# Patient Record
Sex: MALE | Race: WHITE | NOT HISPANIC OR LATINO | Employment: UNEMPLOYED | URBAN - METROPOLITAN AREA
[De-identification: names, ages, dates, MRNs, and addresses within clinical notes are randomized per-mention and may not be internally consistent; named-entity substitution may affect disease eponyms.]

---

## 2017-12-06 ENCOUNTER — NON-PROVIDER VISIT (OUTPATIENT)
Dept: URGENT CARE | Facility: PHYSICIAN GROUP | Age: 42
End: 2017-12-06

## 2017-12-06 DIAGNOSIS — Z02.1 PRE-EMPLOYMENT DRUG SCREENING: ICD-10-CM

## 2017-12-06 LAB
AMP AMPHETAMINE: NORMAL
COC COCAINE: NORMAL
INT CON NEG: NEGATIVE
INT CON POS: POSITIVE
MET METHAMPHETAMINES: NORMAL
OPI OPIATES: NORMAL
PCP PHENCYCLIDINE: NORMAL
POC DRUG COMMENT 753798-OCCUPATIONAL HEALTH: NORMAL
THC: NORMAL

## 2017-12-06 PROCEDURE — 80305 DRUG TEST PRSMV DIR OPT OBS: CPT | Performed by: FAMILY MEDICINE

## 2017-12-12 ENCOUNTER — NON-PROVIDER VISIT (OUTPATIENT)
Dept: OCCUPATIONAL MEDICINE | Facility: CLINIC | Age: 42
End: 2017-12-12

## 2017-12-12 DIAGNOSIS — Z02.83 ENCOUNTER FOR DRUG SCREENING: ICD-10-CM

## 2017-12-12 PROCEDURE — 8911 PR MRO FEE: Performed by: INTERNAL MEDICINE

## 2017-12-13 NOTE — PROGRESS NOTES
Non-conclusive UDS follow up.  MRO confirmation fees need to be charged.  MRO report date 12/12/17

## 2019-02-27 ENCOUNTER — HOSPITAL ENCOUNTER (INPATIENT)
Facility: MEDICAL CENTER | Age: 44
LOS: 4 days | DRG: 082 | End: 2019-03-03
Attending: EMERGENCY MEDICINE | Admitting: SURGERY
Payer: MEDICAID

## 2019-02-27 ENCOUNTER — APPOINTMENT (OUTPATIENT)
Dept: RADIOLOGY | Facility: MEDICAL CENTER | Age: 44
DRG: 082 | End: 2019-02-27
Attending: SURGERY
Payer: MEDICAID

## 2019-02-27 ENCOUNTER — HOSPITAL ENCOUNTER (OUTPATIENT)
Dept: RADIOLOGY | Facility: MEDICAL CENTER | Age: 44
End: 2019-02-27

## 2019-02-27 ENCOUNTER — APPOINTMENT (OUTPATIENT)
Dept: RADIOLOGY | Facility: MEDICAL CENTER | Age: 44
DRG: 082 | End: 2019-02-27
Attending: EMERGENCY MEDICINE
Payer: MEDICAID

## 2019-02-27 DIAGNOSIS — I62.9 INTRACRANIAL HEMORRHAGE (HCC): ICD-10-CM

## 2019-02-27 DIAGNOSIS — T07.XXXA MULTIPLE TRAUMA: ICD-10-CM

## 2019-02-27 DIAGNOSIS — S01.81XA FACIAL LACERATION, INITIAL ENCOUNTER: ICD-10-CM

## 2019-02-27 PROBLEM — S06.6X9A SUBARACHNOID HEMORRHAGE FOLLOWING INJURY, WITH LOSS OF CONSCIOUSNESS (HCC): Status: ACTIVE | Noted: 2019-02-27

## 2019-02-27 PROBLEM — J95.821 ACUTE RESPIRATORY FAILURE FOLLOWING TRAUMA AND SURGERY (HCC): Status: ACTIVE | Noted: 2019-02-27

## 2019-02-27 PROBLEM — S02.92XA MULTIPLE CLOSED FRACTURES OF FACIAL BONE (HCC): Status: ACTIVE | Noted: 2019-02-27

## 2019-02-27 PROBLEM — Z53.09 CONTRAINDICATION TO DEEP VEIN THROMBOSIS (DVT) PROPHYLAXIS: Status: ACTIVE | Noted: 2019-02-27

## 2019-02-27 PROBLEM — T14.90XA TRAUMA: Status: ACTIVE | Noted: 2019-02-27

## 2019-02-27 LAB
ABO GROUP BLD: NORMAL
ABO GROUP BLD: NORMAL
ACTION RANGE TRIGGERED IACRT: NO
ALBUMIN SERPL BCP-MCNC: 3.6 G/DL (ref 3.2–4.9)
ALBUMIN/GLOB SERPL: 1.4 G/DL
ALP SERPL-CCNC: 51 U/L (ref 30–99)
ALT SERPL-CCNC: 11 U/L (ref 2–50)
ANION GAP SERPL CALC-SCNC: 5 MMOL/L (ref 0–11.9)
APTT PPP: 31.2 SEC (ref 24.7–36)
AST SERPL-CCNC: 19 U/L (ref 12–45)
BASE EXCESS BLDA CALC-SCNC: -2 MMOL/L (ref -4–3)
BASE EXCESS BLDA CALC-SCNC: -3 MMOL/L (ref -4–3)
BILIRUB SERPL-MCNC: 0.7 MG/DL (ref 0.1–1.5)
BLD GP AB SCN SERPL QL: NORMAL
BODY TEMPERATURE: ABNORMAL CENTIGRADE
BODY TEMPERATURE: ABNORMAL DEGREES
BUN SERPL-MCNC: 13 MG/DL (ref 8–22)
CALCIUM SERPL-MCNC: 8.1 MG/DL (ref 8.5–10.5)
CFT BLD TEG: 6.9 MIN (ref 5–10)
CHLORIDE SERPL-SCNC: 107 MMOL/L (ref 96–112)
CLOT ANGLE BLD TEG: 59 DEGREES (ref 53–72)
CLOT LYSIS 30M P MA LENFR BLD TEG: 0.2 % (ref 0–8)
CO2 BLDA-SCNC: 23 MMOL/L (ref 20–33)
CO2 SERPL-SCNC: 25 MMOL/L (ref 20–33)
CREAT SERPL-MCNC: 0.82 MG/DL (ref 0.5–1.4)
CT.EXTRINSIC BLD ROTEM: 2.4 MIN (ref 1–3)
ERYTHROCYTE [DISTWIDTH] IN BLOOD BY AUTOMATED COUNT: 46.7 FL (ref 35.9–50)
ETHANOL BLD-MCNC: 0 G/DL
GLOBULIN SER CALC-MCNC: 2.5 G/DL (ref 1.9–3.5)
GLUCOSE BLD-MCNC: 91 MG/DL (ref 65–99)
GLUCOSE SERPL-MCNC: 118 MG/DL (ref 65–99)
HCO3 BLDA-SCNC: 21.5 MMOL/L (ref 17–25)
HCO3 BLDA-SCNC: 24 MMOL/L (ref 17–25)
HCT VFR BLD AUTO: 45.8 % (ref 42–52)
HGB BLD-MCNC: 15.1 G/DL (ref 14–18)
HOROWITZ INDEX BLDA+IHG-RTO: 202 MM[HG]
INR PPP: 1 (ref 0.87–1.13)
INST. QUALIFIED PATIENT IIQPT: YES
LACTATE BLD-SCNC: 1.5 MMOL/L (ref 0.5–2)
MCF BLD TEG: 64.3 MM (ref 50–70)
MCH RBC QN AUTO: 30.9 PG (ref 27–33)
MCHC RBC AUTO-ENTMCNC: 33 G/DL (ref 33.7–35.3)
MCV RBC AUTO: 93.9 FL (ref 81.4–97.8)
O2/TOTAL GAS SETTING VFR VENT: 50 %
PA AA BLD-ACNC: 0 %
PA ADP BLD-ACNC: 51.7 %
PCO2 BLDA: 32.2 MMHG (ref 26–37)
PCO2 BLDA: 48.8 MMHG (ref 26–37)
PCO2 TEMP ADJ BLDA: 32.6 MMHG (ref 26–37)
PH BLDA: 7.3 [PH] (ref 7.4–7.5)
PH BLDA: 7.43 [PH] (ref 7.4–7.5)
PH TEMP ADJ BLDA: 7.43 [PH] (ref 7.4–7.5)
PLATELET # BLD AUTO: 231 K/UL (ref 164–446)
PMV BLD AUTO: 10 FL (ref 9–12.9)
PO2 BLDA: 101 MMHG (ref 64–87)
PO2 BLDA: 141 MMHG (ref 64–87)
PO2 TEMP ADJ BLDA: 102 MMHG (ref 64–87)
POTASSIUM SERPL-SCNC: 4.3 MMOL/L (ref 3.6–5.5)
PROT SERPL-MCNC: 6.1 G/DL (ref 6–8.2)
PROTHROMBIN TIME: 13.3 SEC (ref 12–14.6)
RBC # BLD AUTO: 4.88 M/UL (ref 4.7–6.1)
RH BLD: NORMAL
RH BLD: NORMAL
SAO2 % BLDA: 98 % (ref 93–99)
SAO2 % BLDA: 98.1 % (ref 93–99)
SODIUM SERPL-SCNC: 137 MMOL/L (ref 135–145)
SPECIMEN DRAWN FROM PATIENT: ABNORMAL
TEG ALGORITHM TGALG: NORMAL
TRIGL SERPL-MCNC: 54 MG/DL (ref 0–149)
WBC # BLD AUTO: 11.1 K/UL (ref 4.8–10.8)

## 2019-02-27 PROCEDURE — 71045 X-RAY EXAM CHEST 1 VIEW: CPT

## 2019-02-27 PROCEDURE — 84478 ASSAY OF TRIGLYCERIDES: CPT

## 2019-02-27 PROCEDURE — 700111 HCHG RX REV CODE 636 W/ 250 OVERRIDE (IP): Performed by: SURGERY

## 2019-02-27 PROCEDURE — 80307 DRUG TEST PRSMV CHEM ANLYZR: CPT

## 2019-02-27 PROCEDURE — 99291 CRITICAL CARE FIRST HOUR: CPT

## 2019-02-27 PROCEDURE — 86900 BLOOD TYPING SEROLOGIC ABO: CPT

## 2019-02-27 PROCEDURE — 770022 HCHG ROOM/CARE - ICU (200)

## 2019-02-27 PROCEDURE — 82803 BLOOD GASES ANY COMBINATION: CPT

## 2019-02-27 PROCEDURE — 82962 GLUCOSE BLOOD TEST: CPT

## 2019-02-27 PROCEDURE — 700105 HCHG RX REV CODE 258: Performed by: NURSE PRACTITIONER

## 2019-02-27 PROCEDURE — 700111 HCHG RX REV CODE 636 W/ 250 OVERRIDE (IP): Performed by: NURSE PRACTITIONER

## 2019-02-27 PROCEDURE — 85730 THROMBOPLASTIN TIME PARTIAL: CPT

## 2019-02-27 PROCEDURE — 96365 THER/PROPH/DIAG IV INF INIT: CPT

## 2019-02-27 PROCEDURE — 86901 BLOOD TYPING SEROLOGIC RH(D): CPT

## 2019-02-27 PROCEDURE — 80053 COMPREHEN METABOLIC PANEL: CPT

## 2019-02-27 PROCEDURE — 94002 VENT MGMT INPAT INIT DAY: CPT

## 2019-02-27 PROCEDURE — 85576 BLOOD PLATELET AGGREGATION: CPT | Mod: 91

## 2019-02-27 PROCEDURE — 85610 PROTHROMBIN TIME: CPT

## 2019-02-27 PROCEDURE — 700101 HCHG RX REV CODE 250: Performed by: SURGERY

## 2019-02-27 PROCEDURE — 85027 COMPLETE CBC AUTOMATED: CPT

## 2019-02-27 PROCEDURE — 85347 COAGULATION TIME ACTIVATED: CPT

## 2019-02-27 PROCEDURE — 72125 CT NECK SPINE W/O DYE: CPT

## 2019-02-27 PROCEDURE — G0390 TRAUMA RESPONS W/HOSP CRITI: HCPCS

## 2019-02-27 PROCEDURE — 700101 HCHG RX REV CODE 250: Performed by: NURSE PRACTITIONER

## 2019-02-27 PROCEDURE — 70450 CT HEAD/BRAIN W/O DYE: CPT

## 2019-02-27 PROCEDURE — 85384 FIBRINOGEN ACTIVITY: CPT

## 2019-02-27 PROCEDURE — 99291 CRITICAL CARE FIRST HOUR: CPT | Performed by: SURGERY

## 2019-02-27 PROCEDURE — 90715 TDAP VACCINE 7 YRS/> IM: CPT | Performed by: NURSE PRACTITIONER

## 2019-02-27 PROCEDURE — 83605 ASSAY OF LACTIC ACID: CPT

## 2019-02-27 PROCEDURE — 86850 RBC ANTIBODY SCREEN: CPT

## 2019-02-27 PROCEDURE — 5A1945Z RESPIRATORY VENTILATION, 24-96 CONSECUTIVE HOURS: ICD-10-PCS | Performed by: SURGERY

## 2019-02-27 PROCEDURE — 90471 IMMUNIZATION ADMIN: CPT

## 2019-02-27 RX ORDER — MORPHINE SULFATE 4 MG/ML
4 INJECTION, SOLUTION INTRAMUSCULAR; INTRAVENOUS
Status: DISCONTINUED | OUTPATIENT
Start: 2019-02-27 | End: 2019-03-01

## 2019-02-27 RX ORDER — FAMOTIDINE 20 MG/1
20 TABLET, FILM COATED ORAL 2 TIMES DAILY
Status: DISCONTINUED | OUTPATIENT
Start: 2019-02-27 | End: 2019-02-28

## 2019-02-27 RX ORDER — OXYCODONE HYDROCHLORIDE 10 MG/1
10 TABLET ORAL
Status: DISCONTINUED | OUTPATIENT
Start: 2019-02-27 | End: 2019-03-02

## 2019-02-27 RX ORDER — BISACODYL 10 MG
10 SUPPOSITORY, RECTAL RECTAL
Status: DISCONTINUED | OUTPATIENT
Start: 2019-02-27 | End: 2019-03-03 | Stop reason: HOSPADM

## 2019-02-27 RX ORDER — POLYETHYLENE GLYCOL 3350 17 G/17G
1 POWDER, FOR SOLUTION ORAL 2 TIMES DAILY
Status: DISCONTINUED | OUTPATIENT
Start: 2019-02-27 | End: 2019-03-03 | Stop reason: HOSPADM

## 2019-02-27 RX ORDER — SODIUM CHLORIDE 9 MG/ML
INJECTION, SOLUTION INTRAVENOUS CONTINUOUS
Status: DISCONTINUED | OUTPATIENT
Start: 2019-02-27 | End: 2019-03-01

## 2019-02-27 RX ORDER — ONDANSETRON 2 MG/ML
4 INJECTION INTRAMUSCULAR; INTRAVENOUS EVERY 4 HOURS PRN
Status: DISCONTINUED | OUTPATIENT
Start: 2019-02-27 | End: 2019-03-03 | Stop reason: HOSPADM

## 2019-02-27 RX ORDER — AMOXICILLIN 250 MG
1 CAPSULE ORAL
Status: DISCONTINUED | OUTPATIENT
Start: 2019-02-27 | End: 2019-03-03 | Stop reason: HOSPADM

## 2019-02-27 RX ORDER — MORPHINE SULFATE 4 MG/ML
4 INJECTION, SOLUTION INTRAMUSCULAR; INTRAVENOUS
Status: DISCONTINUED | OUTPATIENT
Start: 2019-02-27 | End: 2019-02-27

## 2019-02-27 RX ORDER — OXYCODONE HYDROCHLORIDE 5 MG/1
5 TABLET ORAL
Status: DISCONTINUED | OUTPATIENT
Start: 2019-02-27 | End: 2019-03-02

## 2019-02-27 RX ORDER — BACITRACIN ZINC AND POLYMYXIN B SULFATE 500; 1000 [USP'U]/G; [USP'U]/G
OINTMENT TOPICAL 2 TIMES DAILY
Status: DISCONTINUED | OUTPATIENT
Start: 2019-02-27 | End: 2019-02-28

## 2019-02-27 RX ORDER — AMOXICILLIN 250 MG
1 CAPSULE ORAL NIGHTLY
Status: DISCONTINUED | OUTPATIENT
Start: 2019-02-27 | End: 2019-03-03 | Stop reason: HOSPADM

## 2019-02-27 RX ORDER — ENEMA 19; 7 G/133ML; G/133ML
1 ENEMA RECTAL
Status: DISCONTINUED | OUTPATIENT
Start: 2019-02-27 | End: 2019-03-03 | Stop reason: HOSPADM

## 2019-02-27 RX ORDER — DEXTROSE MONOHYDRATE 25 G/50ML
25 INJECTION, SOLUTION INTRAVENOUS
Status: DISCONTINUED | OUTPATIENT
Start: 2019-02-27 | End: 2019-02-27

## 2019-02-27 RX ORDER — BACITRACIN ZINC AND POLYMYXIN B SULFATE 500; 1000 [USP'U]/G; [USP'U]/G
OINTMENT TOPICAL 3 TIMES DAILY
Status: DISCONTINUED | OUTPATIENT
Start: 2019-02-27 | End: 2019-02-27

## 2019-02-27 RX ORDER — CEFAZOLIN SODIUM 2 G/100ML
2 INJECTION, SOLUTION INTRAVENOUS EVERY 8 HOURS
Status: COMPLETED | OUTPATIENT
Start: 2019-02-27 | End: 2019-02-27

## 2019-02-27 RX ORDER — DOCUSATE SODIUM 100 MG/1
100 CAPSULE, LIQUID FILLED ORAL 2 TIMES DAILY
Status: DISCONTINUED | OUTPATIENT
Start: 2019-02-27 | End: 2019-03-03 | Stop reason: HOSPADM

## 2019-02-27 RX ADMIN — SODIUM CHLORIDE: 9 INJECTION, SOLUTION INTRAVENOUS at 17:33

## 2019-02-27 RX ADMIN — Medication 1 EACH: at 08:36

## 2019-02-27 RX ADMIN — SODIUM CHLORIDE: 9 INJECTION, SOLUTION INTRAVENOUS at 07:59

## 2019-02-27 RX ADMIN — PROPOFOL 40 MCG/KG/MIN: 10 INJECTION, EMULSION INTRAVENOUS at 07:17

## 2019-02-27 RX ADMIN — Medication 1 EACH: at 17:26

## 2019-02-27 RX ADMIN — MORPHINE SULFATE 4 MG: 4 INJECTION INTRAVENOUS at 22:45

## 2019-02-27 RX ADMIN — SODIUM CHLORIDE 500 MG: 9 INJECTION, SOLUTION INTRAVENOUS at 08:24

## 2019-02-27 RX ADMIN — CLOSTRIDIUM TETANI TOXOID ANTIGEN (FORMALDEHYDE INACTIVATED), CORYNEBACTERIUM DIPHTHERIAE TOXOID ANTIGEN (FORMALDEHYDE INACTIVATED), BORDETELLA PERTUSSIS TOXOID ANTIGEN (GLUTARALDEHYDE INACTIVATED), BORDETELLA PERTUSSIS FILAMENTOUS HEMAGGLUTININ ANTIGEN (FORMALDEHYDE INACTIVATED), BORDETELLA PERTUSSIS PERTACTIN ANTIGEN, AND BORDETELLA PERTUSSIS FIMBRIAE 2/3 ANTIGEN 0.5 ML: 5; 2; 2.5; 5; 3; 5 INJECTION, SUSPENSION INTRAMUSCULAR at 09:40

## 2019-02-27 RX ADMIN — CEFAZOLIN SODIUM 2 G: 2 INJECTION, SOLUTION INTRAVENOUS at 09:00

## 2019-02-27 RX ADMIN — PROPOFOL 30 MCG/KG/MIN: 10 INJECTION, EMULSION INTRAVENOUS at 14:38

## 2019-02-27 RX ADMIN — FAMOTIDINE 20 MG: 10 INJECTION INTRAVENOUS at 17:26

## 2019-02-27 RX ADMIN — SODIUM CHLORIDE 500 MG: 9 INJECTION, SOLUTION INTRAVENOUS at 17:25

## 2019-02-27 RX ADMIN — PROPOFOL 30 MCG/KG/MIN: 10 INJECTION, EMULSION INTRAVENOUS at 20:26

## 2019-02-27 NOTE — CARE PLAN
Problem: Communication  Goal: The ability to communicate needs accurately and effectively will improve  Outcome: NOT MET  Pt intubated not following commands.     Problem: Safety  Goal: Will remain free from injury  Outcome: PROGRESSING AS EXPECTED  Preventative measures in place. Bed in lowest position, locked, and bed alarm on.

## 2019-02-27 NOTE — ASSESSMENT & PLAN NOTE
Left anterior zygoma and left zygomatic arch fracture  likely non-op but will follow for any issues with maximum opening, pain or function.  Jm Aponte MD, DDS. Facial Surgery

## 2019-02-27 NOTE — PROGRESS NOTES
Pt arrived from ED. Pt vented, hooked up to monitor, CHG wiped and 2 RN skin assessment was completed. Wounds documented in flow sheet. No complications.
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
## 2019-02-27 NOTE — ASSESSMENT & PLAN NOTE
Subarachnoid hemorrhage left parietal lobe medially in the region at the falx.   Follow up CT with small amount of subarachnoid hemorrhage in the paramedianleft posterior frontal region. Probable small right inferior frontal hemorrhagic contusion.  AM repeat head CT stable  Non-operative management.   Anti-seizure prophylaxis, Keppra, x 7 days.  Speech therapy for cognitive evaluation  3/1 Post-Acute Therapy: Recommend inpatient transitional care services for continued speech therapy services.    3/3 need to completed cognitive evaluation.     Anthony Cordova MD. Neurosurgery.

## 2019-02-27 NOTE — THERAPY
SPEECH PATHOLOGY:  Order received for cognitive evaluation; however, patient is currently intubated.  Please reconsult once appropriate. Thank you.

## 2019-02-27 NOTE — ASSESSMENT & PLAN NOTE
5cm, left supraorbital  Repaired at referring facility with interrupted sutures   2/17 two grams IV ancef.

## 2019-02-27 NOTE — ASSESSMENT & PLAN NOTE
Systemic anticoagulation contraindicated secondary to subarachnoid hemorrhage.  3/1 Surveillance venous duplex scanning ordered
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
## 2019-02-28 ENCOUNTER — APPOINTMENT (OUTPATIENT)
Dept: RADIOLOGY | Facility: MEDICAL CENTER | Age: 44
DRG: 082 | End: 2019-02-28
Attending: NEUROLOGICAL SURGERY
Payer: MEDICAID

## 2019-02-28 ENCOUNTER — APPOINTMENT (OUTPATIENT)
Dept: RADIOLOGY | Facility: MEDICAL CENTER | Age: 44
DRG: 082 | End: 2019-02-28
Attending: NURSE PRACTITIONER
Payer: MEDICAID

## 2019-02-28 LAB
ALBUMIN SERPL BCP-MCNC: 3.1 G/DL (ref 3.2–4.9)
ALBUMIN/GLOB SERPL: 1.6 G/DL
ALP SERPL-CCNC: 50 U/L (ref 30–99)
ALT SERPL-CCNC: 7 U/L (ref 2–50)
ANION GAP SERPL CALC-SCNC: 10 MMOL/L (ref 0–11.9)
AST SERPL-CCNC: 13 U/L (ref 12–45)
BASOPHILS # BLD AUTO: 0.8 % (ref 0–1.8)
BASOPHILS # BLD: 0.07 K/UL (ref 0–0.12)
BILIRUB SERPL-MCNC: 0.8 MG/DL (ref 0.1–1.5)
BUN SERPL-MCNC: 9 MG/DL (ref 8–22)
CALCIUM SERPL-MCNC: 8.4 MG/DL (ref 8.5–10.5)
CHLORIDE SERPL-SCNC: 111 MMOL/L (ref 96–112)
CO2 SERPL-SCNC: 20 MMOL/L (ref 20–33)
CREAT SERPL-MCNC: 0.75 MG/DL (ref 0.5–1.4)
EOSINOPHIL # BLD AUTO: 0.33 K/UL (ref 0–0.51)
EOSINOPHIL NFR BLD: 3.8 % (ref 0–6.9)
ERYTHROCYTE [DISTWIDTH] IN BLOOD BY AUTOMATED COUNT: 47.4 FL (ref 35.9–50)
GLOBULIN SER CALC-MCNC: 2 G/DL (ref 1.9–3.5)
GLUCOSE SERPL-MCNC: 88 MG/DL (ref 65–99)
HCT VFR BLD AUTO: 40.9 % (ref 42–52)
HGB BLD-MCNC: 13.4 G/DL (ref 14–18)
IMM GRANULOCYTES # BLD AUTO: 0.02 K/UL (ref 0–0.11)
IMM GRANULOCYTES NFR BLD AUTO: 0.2 % (ref 0–0.9)
LYMPHOCYTES # BLD AUTO: 2.28 K/UL (ref 1–4.8)
LYMPHOCYTES NFR BLD: 26.2 % (ref 22–41)
MAGNESIUM SERPL-MCNC: 1.9 MG/DL (ref 1.5–2.5)
MCH RBC QN AUTO: 31 PG (ref 27–33)
MCHC RBC AUTO-ENTMCNC: 32.8 G/DL (ref 33.7–35.3)
MCV RBC AUTO: 94.7 FL (ref 81.4–97.8)
MONOCYTES # BLD AUTO: 0.66 K/UL (ref 0–0.85)
MONOCYTES NFR BLD AUTO: 7.6 % (ref 0–13.4)
NEUTROPHILS # BLD AUTO: 5.35 K/UL (ref 1.82–7.42)
NEUTROPHILS NFR BLD: 61.4 % (ref 44–72)
NRBC # BLD AUTO: 0 K/UL
NRBC BLD-RTO: 0 /100 WBC
PHOSPHATE SERPL-MCNC: 3.2 MG/DL (ref 2.5–4.5)
PLATELET # BLD AUTO: 211 K/UL (ref 164–446)
PMV BLD AUTO: 10.5 FL (ref 9–12.9)
POTASSIUM SERPL-SCNC: 3.5 MMOL/L (ref 3.6–5.5)
PROT SERPL-MCNC: 5.1 G/DL (ref 6–8.2)
RBC # BLD AUTO: 4.32 M/UL (ref 4.7–6.1)
SODIUM SERPL-SCNC: 141 MMOL/L (ref 135–145)
WBC # BLD AUTO: 8.7 K/UL (ref 4.8–10.8)

## 2019-02-28 PROCEDURE — 70450 CT HEAD/BRAIN W/O DYE: CPT

## 2019-02-28 PROCEDURE — 71045 X-RAY EXAM CHEST 1 VIEW: CPT

## 2019-02-28 PROCEDURE — 97166 OT EVAL MOD COMPLEX 45 MIN: CPT

## 2019-02-28 PROCEDURE — 99291 CRITICAL CARE FIRST HOUR: CPT | Performed by: SURGERY

## 2019-02-28 PROCEDURE — 700102 HCHG RX REV CODE 250 W/ 637 OVERRIDE(OP): Performed by: NURSE PRACTITIONER

## 2019-02-28 PROCEDURE — 94150 VITAL CAPACITY TEST: CPT

## 2019-02-28 PROCEDURE — 84100 ASSAY OF PHOSPHORUS: CPT

## 2019-02-28 PROCEDURE — A9270 NON-COVERED ITEM OR SERVICE: HCPCS | Performed by: NURSE PRACTITIONER

## 2019-02-28 PROCEDURE — 85025 COMPLETE CBC W/AUTO DIFF WBC: CPT

## 2019-02-28 PROCEDURE — 770022 HCHG ROOM/CARE - ICU (200)

## 2019-02-28 PROCEDURE — 80053 COMPREHEN METABOLIC PANEL: CPT

## 2019-02-28 PROCEDURE — 700111 HCHG RX REV CODE 636 W/ 250 OVERRIDE (IP): Performed by: SURGERY

## 2019-02-28 PROCEDURE — 94003 VENT MGMT INPAT SUBQ DAY: CPT

## 2019-02-28 PROCEDURE — 700101 HCHG RX REV CODE 250: Performed by: SURGERY

## 2019-02-28 PROCEDURE — 97162 PT EVAL MOD COMPLEX 30 MIN: CPT

## 2019-02-28 PROCEDURE — 700111 HCHG RX REV CODE 636 W/ 250 OVERRIDE (IP): Performed by: NURSE PRACTITIONER

## 2019-02-28 PROCEDURE — 83735 ASSAY OF MAGNESIUM: CPT

## 2019-02-28 PROCEDURE — 700105 HCHG RX REV CODE 258: Performed by: NURSE PRACTITIONER

## 2019-02-28 RX ORDER — POTASSIUM CHLORIDE 7.45 MG/ML
10 INJECTION INTRAVENOUS
Status: COMPLETED | OUTPATIENT
Start: 2019-02-28 | End: 2019-02-28

## 2019-02-28 RX ORDER — MAGNESIUM SULFATE HEPTAHYDRATE 40 MG/ML
2 INJECTION, SOLUTION INTRAVENOUS ONCE
Status: COMPLETED | OUTPATIENT
Start: 2019-02-28 | End: 2019-02-28

## 2019-02-28 RX ADMIN — FAMOTIDINE 20 MG: 10 INJECTION INTRAVENOUS at 05:14

## 2019-02-28 RX ADMIN — Medication 1 EACH: at 05:14

## 2019-02-28 RX ADMIN — SODIUM CHLORIDE: 9 INJECTION, SOLUTION INTRAVENOUS at 02:45

## 2019-02-28 RX ADMIN — POTASSIUM CHLORIDE 10 MEQ: 7.46 INJECTION, SOLUTION INTRAVENOUS at 17:27

## 2019-02-28 RX ADMIN — MAGNESIUM SULFATE HEPTAHYDRATE 2 G: 40 INJECTION, SOLUTION INTRAVENOUS at 09:25

## 2019-02-28 RX ADMIN — SODIUM CHLORIDE 500 MG: 9 INJECTION, SOLUTION INTRAVENOUS at 05:14

## 2019-02-28 RX ADMIN — MORPHINE SULFATE 4 MG: 4 INJECTION INTRAVENOUS at 03:41

## 2019-02-28 RX ADMIN — POTASSIUM CHLORIDE 10 MEQ: 7.46 INJECTION, SOLUTION INTRAVENOUS at 09:25

## 2019-02-28 RX ADMIN — POTASSIUM CHLORIDE 10 MEQ: 7.46 INJECTION, SOLUTION INTRAVENOUS at 14:50

## 2019-02-28 RX ADMIN — POTASSIUM CHLORIDE 10 MEQ: 7.46 INJECTION, SOLUTION INTRAVENOUS at 11:38

## 2019-02-28 RX ADMIN — PROPOFOL 50 MCG/KG/MIN: 10 INJECTION, EMULSION INTRAVENOUS at 06:17

## 2019-02-28 RX ADMIN — MORPHINE SULFATE 4 MG: 4 INJECTION INTRAVENOUS at 06:17

## 2019-02-28 RX ADMIN — OXYCODONE HYDROCHLORIDE 5 MG: 5 TABLET ORAL at 19:29

## 2019-02-28 RX ADMIN — SODIUM CHLORIDE 500 MG: 9 INJECTION, SOLUTION INTRAVENOUS at 17:26

## 2019-02-28 RX ADMIN — PROPOFOL 35 MCG/KG/MIN: 10 INJECTION, EMULSION INTRAVENOUS at 01:33

## 2019-02-28 ASSESSMENT — COGNITIVE AND FUNCTIONAL STATUS - GENERAL
TOILETING: A LITTLE
TURNING FROM BACK TO SIDE WHILE IN FLAT BAD: A LITTLE
DRESSING REGULAR LOWER BODY CLOTHING: A LITTLE
MOBILITY SCORE: 19
DAILY ACTIVITIY SCORE: 18
HELP NEEDED FOR BATHING: A LITTLE
WALKING IN HOSPITAL ROOM: A LITTLE
CLIMB 3 TO 5 STEPS WITH RAILING: A LITTLE
SUGGESTED CMS G CODE MODIFIER DAILY ACTIVITY: CK
MOVING FROM LYING ON BACK TO SITTING ON SIDE OF FLAT BED: A LITTLE
EATING MEALS: TOTAL
SUGGESTED CMS G CODE MODIFIER MOBILITY: CK
STANDING UP FROM CHAIR USING ARMS: A LITTLE

## 2019-02-28 ASSESSMENT — PULMONARY FUNCTION TESTS: FVC: 989

## 2019-02-28 ASSESSMENT — GAIT ASSESSMENTS
DEVIATION: SHUFFLED GAIT;BRADYKINETIC
DISTANCE (FEET): 4
GAIT LEVEL OF ASSIST: MINIMAL ASSIST

## 2019-02-28 ASSESSMENT — ACTIVITIES OF DAILY LIVING (ADL): TOILETING: INDEPENDENT

## 2019-02-28 ASSESSMENT — LIFESTYLE VARIABLES: EVER_SMOKED: YES

## 2019-02-28 NOTE — PROGRESS NOTES
Teresa LARA with NS at bedside, assessed patient and discussed POC with RN. OK with Q2H Neuro checks. No pharmacological DVT prophylaxis at this time.

## 2019-02-28 NOTE — THERAPY
"Physical Therapy Evaluation completed.   Bed Mobility:  Supine to Sit: Supervised  Transfers: Sit to Stand: Minimal Assist  Gait: Level Of Assist: Minimal Assist with No Equipment Needed     4 ft  Plan of Care: Patient with no further skilled PT needs in the acute care setting at this time  Discharge Recommendations: Equipment: Will Continue to Assess for Equipment Needs.    See \"Rehab Therapy-Acute\" Patient Summary Report for complete documentation.     "
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
## 2019-02-28 NOTE — PROGRESS NOTES
Neurosurgery Progress Note    Subjective:  Pt is drowsy at this time, but RN indicated that he has been awake, and able to fc's with all extremities. She states he has been cooperative and she hopes to get him extubated today.    Exam:  As above  Sleepy right now, is purposeful with stim, viktor's  Left eye swollen and ecchymotic    Pulse  Av.9  Min: 92  Max: 111  Resp  Av.2  Min: 17  Max: 22  Monitored Temp 2  Av.9 °C (98.5 °F)  Min: 35.5 °C (95.9 °F)  Max: 37.6 °C (99.7 °F)  SpO2  Av.5 %  Min: 94 %  Max: 100 %    No Data Recorded    Recent Labs      19   0719   0520   WBC  11.1*  8.7   RBC  4.88  4.32*   HEMOGLOBIN  15.1  13.4*   HEMATOCRIT  45.8  40.9*   MCV  93.9  94.7   MCH  30.9  31.0   MCHC  33.0*  32.8*   RDW  46.7  47.4   PLATELETCT  231  211   MPV  10.0  10.5     Recent Labs      19   0719  19   0520   SODIUM  137  141   POTASSIUM  4.3  3.5*   CHLORIDE  107  111   CO2  25  20   GLUCOSE  118*  88   BUN  13  9   CREATININE  0.82  0.75   CALCIUM  8.1*  8.4*     Recent Labs      19   APTT  31.2   INR  1.00     Recent Labs      19   07   REACTMIN  6.9   CLOTKINET  2.4   CLOTANGL  59.0   MAXCLOTS  64.3   [id number]  0.2   PRCINADP  51.7   PRCINAA  0.0       Intake/Output       19 - 1959 19 - 19 0659       Total [id number] Total       Intake    I.V.  2582.4  1724.6 4307  --  -- --    Pre-Hospital Volume 200 -- 200 -- -- --    Trauma Resuscitation Volume 1000 -- 1000 -- -- --    Propofol Volume 130.4 224.6 354.9 -- -- --    Volume (mL) (NS infusion) 1252.1 1500 2752.1 -- -- --    Blood  0  -- 0  --  -- --    PRBC Total Volume (Non-Barcoded) 0 -- 0 -- -- --    FFP Total Volume (Non-Barcoded) 0 -- 0 -- -- --    Platelets Total Volume (Non-Barcoded) 0 -- 0 -- -- --    Cryoprecipitate (Pooled) Total Volume (Non-Barcoded) 0 -- 0 -- -- --    Cryoprecipitate (Single) Total Volume (Non-Barcoded)  0 -- 0 -- -- --    IV Piggyback  433.3  -- 433.3  --  -- --    Volume (mL) (levETIRAcetam (KEPPRA) 500 mg in  mL IVPB) 333.3 -- 333.3 -- -- --    Volume (mL) (ceFAZolin in dextrose (ANCEF) IVPB premix 2 g) 100 -- 100 -- -- --    Total Intake 3015.8 1724.6 4740.3 -- -- --       Output    Urine  725  675 1400  100  -- 100    Output (mL) (Urethral Catheter Latex 16 Fr.)  100 -- 100    Other  0  -- 0  --  -- --    Pre-Hospital Output 0 -- 0 -- -- --    Trauma Resuscitation Output 0 -- 0 -- -- --    Stool  --  -- --  --  -- --    Number of Times Stooled 0 x 0 x 0 x 0 x -- 0 x    Emesis/NG output  90  20 110  0  -- 0    Output (mL) (Enteral Tube 02/27/19 Orogastric) 90 20 110 0 -- 0    Total Output  100 -- 100       Net I/O     2200.8 1029.6 3230.3 -100 -- -100            Intake/Output Summary (Last 24 hours) at 02/28/19 0901  Last data filed at 02/28/19 0800   Gross per 24 hour   Intake          3538.25 ml   Output             1435 ml   Net          2103.25 ml            • PEDS potassium chloride (KCL-PERIPHERAL) IV  10 mEq Q HOUR   • magnesium sulfate  2 g Once   • Respiratory Care per Protocol   Continuous RT   • Pharmacy Consult Request  1 Each PHARMACY TO DOSE   • docusate sodium  100 mg BID   • senna-docusate  1 Tab Nightly   • senna-docusate  1 Tab Q24HRS PRN   • polyethylene glycol/lytes  1 Packet BID   • magnesium hydroxide  30 mL DAILY   • bisacodyl  10 mg Q24HRS PRN   • fleet  1 Each Once PRN   • NS   Continuous   • oxyCODONE immediate-release  5 mg Q3HRS PRN   • oxyCODONE immediate release  10 mg Q3HRS PRN   • famotidine  20 mg BID    Or   • famotidine  20 mg BID   • ondansetron  4 mg Q4HRS PRN   • levETIRAcetam (KEPPRA) IV  500 mg BID   • morphine injection  4 mg Q2HRS PRN   • bacitracin-polymyxin b   BID   • propofol  0-80 mcg/kg/min Continuous       Assessment and Plan:  Hospital day # 2  POD #0  Prophylactic anticoagulation: no         Start date/time: tbd  NM  stable  Hopefully pt will be extubated and then he can be mobilized  CT - 2/28  1.  Stable LEFT posterior frontal subarachnoid hemorrhage.  2.  Marked scalp swelling particularly in the LEFT parietal region.  3.  No significant change from prior exam.     Agree with plan of wean sedation and extubation.
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
## 2019-02-28 NOTE — CARE PLAN
Problem: Venous Thromboembolism (VTW)/Deep Vein Thrombosis (DVT) Prevention:  Goal: Patient will participate in Venous Thrombosis (VTE)/Deep Vein Thrombosis (DVT)Prevention Measures    Intervention: Ensure patient wears graduated elastic stockings (MICHAEL hose) and/or SCDs, if ordered, when in bed or chair (Remove at least once per shift for skin check)  SCDs on      Problem: Skin Integrity  Goal: Risk for impaired skin integrity will decrease    Intervention: Implement precautions to protect skin integrity in collaboration with the interdisciplinary team  Q2 turns in place. Padding under C collar. ET tube moved Q2 hours.

## 2019-02-28 NOTE — CARE PLAN
Problem: Ventilation Defect:  Goal: Ability to achieve and maintain unassisted ventilation or tolerate decreased levels of ventilator support    Intervention: Support and monitor invasive and noninvasive mechanical ventilation  Vent Day #1    %  PEEP 8  FiO2 50%  Intervention: Monitor ventilator weaning response  No SBTs  Intervention: Perform ventilator associated pneumonia prevention interventions  VAP Flowsheet  Intervention: Manage ventilation therapy by monitoring diagnostic test results  ABGs
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
## 2019-02-28 NOTE — PROGRESS NOTES
2 RN skin check complete.    -L eye bruising, swelling with dried blood  -Sutured laceration on L eyebrow  -Generalized facial swelling and bruising  -Lips swollen with dried blood  -C collar padded and intact  -Back red and blanching  -Sacrum and heels intact  -Mepilex in place and heels floated on pillows.     All skin breakdown precautions implemented.
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
## 2019-02-28 NOTE — FLOWSHEET NOTE
02/28/19 0811   Weaning Parameters   RR (bpm) 18   #FVC / Vital Capacity (liters)  989   NIF (cm H2O)  -41   Rapid Shallow Breathing Index (RR/VT) 39   Spontaneous VE 8.2   Spontaneous    Weaning Trial   Weaning Trial Stopped due to: Pt weaned for 1 hour and returned to rest settings per protocol   Length of Weaning Trial (Hours) 1   Vent Weaning Smart Text completed? Yes

## 2019-02-28 NOTE — RESPIRATORY CARE
Extubation    Cuff leak noted: Yes  Stridor present: No  O2 (LPM): (P) 4   Patient toleration: Well  RCP Complete? Yes

## 2019-03-01 ENCOUNTER — APPOINTMENT (OUTPATIENT)
Dept: RADIOLOGY | Facility: MEDICAL CENTER | Age: 44
DRG: 082 | End: 2019-03-01
Attending: NURSE PRACTITIONER
Payer: MEDICAID

## 2019-03-01 LAB
ANION GAP SERPL CALC-SCNC: 8 MMOL/L (ref 0–11.9)
BASOPHILS # BLD AUTO: 0.8 % (ref 0–1.8)
BASOPHILS # BLD: 0.08 K/UL (ref 0–0.12)
BUN SERPL-MCNC: 8 MG/DL (ref 8–22)
CALCIUM SERPL-MCNC: 8.7 MG/DL (ref 8.5–10.5)
CHLORIDE SERPL-SCNC: 107 MMOL/L (ref 96–112)
CO2 SERPL-SCNC: 20 MMOL/L (ref 20–33)
CREAT SERPL-MCNC: 0.7 MG/DL (ref 0.5–1.4)
EOSINOPHIL # BLD AUTO: 0.41 K/UL (ref 0–0.51)
EOSINOPHIL NFR BLD: 4.3 % (ref 0–6.9)
ERYTHROCYTE [DISTWIDTH] IN BLOOD BY AUTOMATED COUNT: 44.6 FL (ref 35.9–50)
GLUCOSE SERPL-MCNC: 72 MG/DL (ref 65–99)
HCT VFR BLD AUTO: 43.9 % (ref 42–52)
HGB BLD-MCNC: 14.3 G/DL (ref 14–18)
IMM GRANULOCYTES # BLD AUTO: 0.03 K/UL (ref 0–0.11)
IMM GRANULOCYTES NFR BLD AUTO: 0.3 % (ref 0–0.9)
LYMPHOCYTES # BLD AUTO: 2.08 K/UL (ref 1–4.8)
LYMPHOCYTES NFR BLD: 21.7 % (ref 22–41)
MCH RBC QN AUTO: 31 PG (ref 27–33)
MCHC RBC AUTO-ENTMCNC: 32.6 G/DL (ref 33.7–35.3)
MCV RBC AUTO: 95 FL (ref 81.4–97.8)
MONOCYTES # BLD AUTO: 0.85 K/UL (ref 0–0.85)
MONOCYTES NFR BLD AUTO: 8.9 % (ref 0–13.4)
NEUTROPHILS # BLD AUTO: 6.14 K/UL (ref 1.82–7.42)
NEUTROPHILS NFR BLD: 64 % (ref 44–72)
NRBC # BLD AUTO: 0 K/UL
NRBC BLD-RTO: 0 /100 WBC
PLATELET # BLD AUTO: 206 K/UL (ref 164–446)
PMV BLD AUTO: 10.1 FL (ref 9–12.9)
POTASSIUM SERPL-SCNC: 4 MMOL/L (ref 3.6–5.5)
RBC # BLD AUTO: 4.62 M/UL (ref 4.7–6.1)
SODIUM SERPL-SCNC: 135 MMOL/L (ref 135–145)
WBC # BLD AUTO: 9.6 K/UL (ref 4.8–10.8)

## 2019-03-01 PROCEDURE — 80048 BASIC METABOLIC PNL TOTAL CA: CPT

## 2019-03-01 PROCEDURE — 92523 SPEECH SOUND LANG COMPREHEN: CPT

## 2019-03-01 PROCEDURE — 93971 EXTREMITY STUDY: CPT | Mod: 26 | Performed by: SURGERY

## 2019-03-01 PROCEDURE — A9270 NON-COVERED ITEM OR SERVICE: HCPCS | Performed by: NURSE PRACTITIONER

## 2019-03-01 PROCEDURE — 700102 HCHG RX REV CODE 250 W/ 637 OVERRIDE(OP): Performed by: NURSE PRACTITIONER

## 2019-03-01 PROCEDURE — 93970 EXTREMITY STUDY: CPT

## 2019-03-01 PROCEDURE — 85025 COMPLETE CBC W/AUTO DIFF WBC: CPT

## 2019-03-01 PROCEDURE — 700105 HCHG RX REV CODE 258: Performed by: NURSE PRACTITIONER

## 2019-03-01 PROCEDURE — 700111 HCHG RX REV CODE 636 W/ 250 OVERRIDE (IP): Performed by: NURSE PRACTITIONER

## 2019-03-01 PROCEDURE — 99233 SBSQ HOSP IP/OBS HIGH 50: CPT | Performed by: SURGERY

## 2019-03-01 PROCEDURE — 770001 HCHG ROOM/CARE - MED/SURG/GYN PRIV*

## 2019-03-01 RX ORDER — LEVETIRACETAM 250 MG/1
500 TABLET ORAL 2 TIMES DAILY
Status: DISCONTINUED | OUTPATIENT
Start: 2019-03-01 | End: 2019-03-03 | Stop reason: HOSPADM

## 2019-03-01 RX ORDER — MORPHINE SULFATE 10 MG/ML
2 INJECTION, SOLUTION INTRAMUSCULAR; INTRAVENOUS
Status: DISCONTINUED | OUTPATIENT
Start: 2019-03-01 | End: 2019-03-03 | Stop reason: HOSPADM

## 2019-03-01 RX ADMIN — OXYCODONE HYDROCHLORIDE 5 MG: 5 TABLET ORAL at 10:41

## 2019-03-01 RX ADMIN — OXYCODONE HYDROCHLORIDE 10 MG: 10 TABLET ORAL at 00:36

## 2019-03-01 RX ADMIN — SODIUM CHLORIDE 500 MG: 9 INJECTION, SOLUTION INTRAVENOUS at 05:24

## 2019-03-01 RX ADMIN — LEVETIRACETAM 500 MG: 250 TABLET, FILM COATED ORAL at 17:09

## 2019-03-01 RX ADMIN — MORPHINE SULFATE 2 MG: 10 INJECTION INTRAVENOUS at 21:20

## 2019-03-01 RX ADMIN — SODIUM CHLORIDE: 9 INJECTION, SOLUTION INTRAVENOUS at 04:26

## 2019-03-01 RX ADMIN — OXYCODONE HYDROCHLORIDE 5 MG: 5 TABLET ORAL at 16:19

## 2019-03-01 RX ADMIN — MORPHINE SULFATE 2 MG: 10 INJECTION INTRAVENOUS at 17:30

## 2019-03-01 RX ADMIN — OXYCODONE HYDROCHLORIDE 10 MG: 10 TABLET ORAL at 23:37

## 2019-03-01 ASSESSMENT — ENCOUNTER SYMPTOMS
BLURRED VISION: 1
NECK PAIN: 0
SHORTNESS OF BREATH: 0
VOMITING: 0
DIZZINESS: 1
HEADACHES: 1
SPEECH CHANGE: 0
BACK PAIN: 0
POLYDIPSIA: 0
CHILLS: 0
MYALGIAS: 1
NAUSEA: 0
FEVER: 0
ABDOMINAL PAIN: 0

## 2019-03-01 ASSESSMENT — PATIENT HEALTH QUESTIONNAIRE - PHQ9
2. FEELING DOWN, DEPRESSED, IRRITABLE, OR HOPELESS: NOT AT ALL
1. LITTLE INTEREST OR PLEASURE IN DOING THINGS: NOT AT ALL
SUM OF ALL RESPONSES TO PHQ9 QUESTIONS 1 AND 2: 0

## 2019-03-01 ASSESSMENT — LIFESTYLE VARIABLES
ALCOHOL_USE: NO
EVER_SMOKED: YES

## 2019-03-01 NOTE — PROGRESS NOTES
2 Rn skin check. L eye swollen, red and purple. No other areas of concern.     Pt at risk for skin breakdown r/t bedrest and injury. Pt turned minimum q 2 hr while on bedrest, skin assessed over bony prominences, skin protectant applied, pillows placed. Assessed for efficacy of interventions frequently.
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
## 2019-03-01 NOTE — PROGRESS NOTES
Neurosurgery Progress Note    Subjective:  Pt is doing ok, he reports hunger.    Exam:  Readily awakens, cooperative, fc's, hoang's, no drift. OX3  Left eye swollen and ecchymotic    Pulse  Av.4  Min: 83  Max: 100  Resp  Av.7  Min: 15  Max: 32  Temp  Av.2 °C (98.9 °F)  Min: 36.8 °C (98.3 °F)  Max: 37.4 °C (99.3 °F)  Monitored Temp 2  Av.4 °C (99.3 °F)  Min: 37.2 °C (99 °F)  Max: 37.5 °C (99.5 °F)  SpO2  Av.2 %  Min: 94 %  Max: 100 %    No Data Recorded    Recent Labs      19   0719   0520  19   0530   WBC  11.1*  8.7  9.6   RBC  4.88  4.32*  4.62*   HEMOGLOBIN  15.1  13.4*  14.3   HEMATOCRIT  45.8  40.9*  43.9   MCV  93.9  94.7  95.0   MCH  30.9  31.0  31.0   MCHC  33.0*  32.8*  32.6*   RDW  46.7  47.4  44.6   PLATELETCT  231  211  206   MPV  10.0  10.5  10.1     Recent Labs      19   0719  19   0520  19   0530   SODIUM  137  141  135   POTASSIUM  4.3  3.5*  4.0   CHLORIDE  107  111  107   CO2  25  20  20   GLUCOSE  118*  88  72   BUN  13  9  8   CREATININE  0.82  0.75  0.70   CALCIUM  8.1*  8.4*  8.7     Recent Labs      19   07   APTT  31.2   INR  1.00     Recent Labs      19   07   REACTMIN  6.9   CLOTKINET  2.4   CLOTANGL  59.0   MAXCLOTS  64.3   [id number]  0.2   PRCINADP  51.7   PRCINAA  0.0       Intake/Output       19 - 1959 19 - 1959       Total 1900-0659 Total       Intake    I.V.  1591  1500 3091  250  -- 250    Magnesium Sulfate Volume 50 -- 50 -- -- --    Propofol Volume 41 -- 41 -- -- --    Volume (mL) (NS infusion) 1500 1500 3000 250 -- 250    IV Piggyback  180.8  22.5 203.3  --  -- --    Volume (mL) (potassium chloride (KCL) ivpb 10 mEq) 180.8 22.5 203.3 -- -- --    Total Intake 1771.8 1522.5 3294.3 250 -- 250       Output    Urine  990  1500 2490  --  -- --    Number of Times Voided 1 x 3 x 4 x 0 x -- 0 x    Urine Void (mL) 700 1500 2200 -- -- --    Output  (mL) ([REMOVED] Urethral Catheter Latex 16 Fr.) 290 -- 290 -- -- --    Stool  --  -- --  --  -- --    Number of Times Stooled 0 x 0 x 0 x 0 x -- 0 x    Emesis/NG output  0  -- 0  --  -- --    Output (mL) ([REMOVED] Enteral Tube 02/27/19 Orogastric) 0 -- 0 -- -- --    Total Output 990 1500 2490 -- -- --       Net I/O     781.8 22.5 804.3 250 -- 250            Intake/Output Summary (Last 24 hours) at 03/01/19 0907  Last data filed at 03/01/19 0800   Gross per 24 hour   Intake          3253.33 ml   Output             2390 ml   Net           863.33 ml            • Respiratory Care per Protocol   Continuous RT   • Pharmacy Consult Request  1 Each PHARMACY TO DOSE   • docusate sodium  100 mg BID   • senna-docusate  1 Tab Nightly   • senna-docusate  1 Tab Q24HRS PRN   • polyethylene glycol/lytes  1 Packet BID   • magnesium hydroxide  30 mL DAILY   • bisacodyl  10 mg Q24HRS PRN   • fleet  1 Each Once PRN   • NS   Continuous   • oxyCODONE immediate-release  5 mg Q3HRS PRN   • oxyCODONE immediate release  10 mg Q3HRS PRN   • ondansetron  4 mg Q4HRS PRN   • levETIRAcetam (KEPPRA) IV  500 mg BID   • morphine injection  4 mg Q2HRS PRN       Assessment and Plan:  Hospital day # 3 - no plans from a Nsx perspective  POD #0  Prophylactic anticoagulation: no         Start date/time: tbd  NM stable  Ok for pt to transfer to floor from our perspective & to increase activity  CT - 2/28  1.  Stable LEFT posterior frontal subarachnoid hemorrhage.  2.  Marked scalp swelling particularly in the LEFT parietal region.  3.  No significant change from prior exam.

## 2019-03-01 NOTE — CARE PLAN
Problem: Skin Integrity  Goal: Risk for impaired skin integrity will decrease    Intervention: Assess risk factors for impaired skin integrity and/or pressure ulcers  Patient turns himself and no pressure injuries identified.      Problem: Pain Management  Goal: Pain level will decrease to patient's comfort goal    Intervention: Follow pain managment plan developed in collaboration with patient and Interdisciplinary Team  Pt educated on fall prevention measures.  bedalarm activated and room near nursing station.

## 2019-03-01 NOTE — PROGRESS NOTES
Discussed pt's plan of care with Teresa neuro sx APN.  OK for Q4hrs neuro checks and transfer out of the ICU.  Dr. Interiano updated
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
## 2019-03-01 NOTE — DISCHARGE PLANNING
Care Transition Team Assessment    Information for the assessment was compiled from an interview with the pt's son, Osmany, as well as chart information. Osmany reports that the pt was recently released from [facility]. The does not work or have insurance. The pt lives in a house with another male house mate. The pt does not drive or work. Pt was completely independent with ADl/IADls prior to being admitted. DC disposition is not known at the time of this assessment. PFA has screened the pt.     Information Source  Orientation : Oriented x 4  Information Given By: Relative  Informant's Name: Osmany Jalloh  Who is responsible for making decisions for patient? : Patient    Readmission Evaluation  Is this a readmission?: No    Elopement Risk  Legal Hold: No  Ambulatory or Self Mobile in Wheelchair: No-Not an Elopement Risk  Elopement Risk: Not at Risk for Elopement    Interdisciplinary Discharge Planning  Housing / Facility: Unable To Determine At This Time    Discharge Preparedness  What is your plan after discharge?: Uncertain - pending medical team collaboration  What are your discharge supports?: Child  Prior Functional Level: Ambulatory, Independent with Activities of Daily Living, Independent with Medication Management  Difficulity with ADLs: None  Difficulity with IADLs: None    Functional Assesment  Prior Functional Level: Ambulatory, Independent with Activities of Daily Living, Independent with Medication Management    Finances  Financial Barriers to Discharge: Yes  Average Monthly Income: 0 $  Source of Income: None  Prescription Coverage: No  Prescription Coverage Comments: Pt has no insurance    Vision / Hearing Impairment  Right Eye Vision:  (Unable to assess)  Left Eye Vision: Other (Comments) ([facility])         Advance Directive  Advance Directive?: None  Advance Directive offered?: AD Booklet refused         Psychological Assessment  History of Substance Abuse: Other (comment) (unknown which type of  drugs)  Substance Abuse Comments: Pt had issues with drug use before entering [facility]. No reports of recent drug use.  History of Psychiatric Problems: No  Non-compliant with Treatment: No  Newly Diagnosed Illness: Yes    Discharge Risks or Barriers  Discharge risks or barriers?: Uninsured / underinsured, Transportation, Post-acute placement / services  Patient risk factors: Lack of outside supports, No PCP, Uninsured or underinsured    Anticipated Discharge Information  Anticipated discharge disposition: Discharge needs currently unknown

## 2019-03-01 NOTE — THERAPY
"Occupational Therapy Evaluation completed.   Functional Status:  Min A with ADLs and txfs  Plan of Care: Will benefit from Occupational Therapy 3 times per week  Discharge Recommendations:  Equipment: Will Continue to Assess for Equipment Needs. Post-acute therapy Discharge to a transitional care facility for continued therapy services.    See \"Rehab Therapy-Acute\" Patient Summary Report for complete documentation.    "

## 2019-03-01 NOTE — CARE PLAN
Problem: Skin Integrity  Goal: Risk for impaired skin integrity will decrease    Intervention: Implement precautions to protect skin integrity in collaboration with the interdisciplinary team  Pt turns self in bed/chair, pillows used to float heels and arms.      Problem: Pain Management  Goal: Pain level will decrease to patient's comfort goal    Intervention: Follow pain managment plan developed in collaboration with patient and Interdisciplinary Team  Q2 pain assessments implemented and medication administered per MAR.

## 2019-03-01 NOTE — PROGRESS NOTES
2 RN skin check     +laceration above left eyebrow, sutured/well approximated   No pressure injuries

## 2019-03-01 NOTE — DISCHARGE PLANNING
Current documentation reveals a potential for acute inpatient rehabilitation, please consider a PMR referral to assist with discharge planning. Please have PFA screen for a medical provider if they have not all ready.

## 2019-03-02 PROBLEM — Z02.9 DISCHARGE PLANNING ISSUES: Status: ACTIVE | Noted: 2019-03-02

## 2019-03-02 LAB
ANION GAP SERPL CALC-SCNC: 6 MMOL/L (ref 0–11.9)
BASOPHILS # BLD AUTO: 0.8 % (ref 0–1.8)
BASOPHILS # BLD: 0.07 K/UL (ref 0–0.12)
BUN SERPL-MCNC: 10 MG/DL (ref 8–22)
CALCIUM SERPL-MCNC: 9 MG/DL (ref 8.5–10.5)
CHLORIDE SERPL-SCNC: 105 MMOL/L (ref 96–112)
CO2 SERPL-SCNC: 26 MMOL/L (ref 20–33)
CREAT SERPL-MCNC: 0.82 MG/DL (ref 0.5–1.4)
EOSINOPHIL # BLD AUTO: 0.54 K/UL (ref 0–0.51)
EOSINOPHIL NFR BLD: 6 % (ref 0–6.9)
ERYTHROCYTE [DISTWIDTH] IN BLOOD BY AUTOMATED COUNT: 45.1 FL (ref 35.9–50)
GLUCOSE SERPL-MCNC: 124 MG/DL (ref 65–99)
HCT VFR BLD AUTO: 47.2 % (ref 42–52)
HGB BLD-MCNC: 15.4 G/DL (ref 14–18)
IMM GRANULOCYTES # BLD AUTO: 0.04 K/UL (ref 0–0.11)
IMM GRANULOCYTES NFR BLD AUTO: 0.4 % (ref 0–0.9)
LYMPHOCYTES # BLD AUTO: 1.91 K/UL (ref 1–4.8)
LYMPHOCYTES NFR BLD: 21.1 % (ref 22–41)
MCH RBC QN AUTO: 30.6 PG (ref 27–33)
MCHC RBC AUTO-ENTMCNC: 32.6 G/DL (ref 33.7–35.3)
MCV RBC AUTO: 93.8 FL (ref 81.4–97.8)
MONOCYTES # BLD AUTO: 0.95 K/UL (ref 0–0.85)
MONOCYTES NFR BLD AUTO: 10.5 % (ref 0–13.4)
NEUTROPHILS # BLD AUTO: 5.55 K/UL (ref 1.82–7.42)
NEUTROPHILS NFR BLD: 61.2 % (ref 44–72)
NRBC # BLD AUTO: 0 K/UL
NRBC BLD-RTO: 0 /100 WBC
PLATELET # BLD AUTO: 252 K/UL (ref 164–446)
PMV BLD AUTO: 10.3 FL (ref 9–12.9)
POTASSIUM SERPL-SCNC: 3.8 MMOL/L (ref 3.6–5.5)
RBC # BLD AUTO: 5.03 M/UL (ref 4.7–6.1)
SODIUM SERPL-SCNC: 137 MMOL/L (ref 135–145)
WBC # BLD AUTO: 9.1 K/UL (ref 4.8–10.8)

## 2019-03-02 PROCEDURE — 700102 HCHG RX REV CODE 250 W/ 637 OVERRIDE(OP): Performed by: NURSE PRACTITIONER

## 2019-03-02 PROCEDURE — A9270 NON-COVERED ITEM OR SERVICE: HCPCS | Performed by: NURSE PRACTITIONER

## 2019-03-02 PROCEDURE — 770001 HCHG ROOM/CARE - MED/SURG/GYN PRIV*

## 2019-03-02 PROCEDURE — 36415 COLL VENOUS BLD VENIPUNCTURE: CPT

## 2019-03-02 PROCEDURE — 700112 HCHG RX REV CODE 229: Performed by: NURSE PRACTITIONER

## 2019-03-02 PROCEDURE — 80048 BASIC METABOLIC PNL TOTAL CA: CPT

## 2019-03-02 PROCEDURE — 85025 COMPLETE CBC W/AUTO DIFF WBC: CPT

## 2019-03-02 PROCEDURE — 700111 HCHG RX REV CODE 636 W/ 250 OVERRIDE (IP): Performed by: NURSE PRACTITIONER

## 2019-03-02 RX ORDER — OXYCODONE HYDROCHLORIDE 5 MG/1
5 TABLET ORAL
Status: DISCONTINUED | OUTPATIENT
Start: 2019-03-02 | End: 2019-03-03 | Stop reason: HOSPADM

## 2019-03-02 RX ORDER — OXYCODONE HYDROCHLORIDE 10 MG/1
10 TABLET ORAL
Status: DISCONTINUED | OUTPATIENT
Start: 2019-03-02 | End: 2019-03-03 | Stop reason: HOSPADM

## 2019-03-02 RX ADMIN — SENNOSIDES AND DOCUSATE SODIUM 1 TABLET: 8.6; 5 TABLET ORAL at 22:25

## 2019-03-02 RX ADMIN — OXYCODONE HYDROCHLORIDE 10 MG: 10 TABLET ORAL at 19:11

## 2019-03-02 RX ADMIN — OXYCODONE HYDROCHLORIDE 10 MG: 10 TABLET ORAL at 04:45

## 2019-03-02 RX ADMIN — MAGNESIUM HYDROXIDE 30 ML: 400 SUSPENSION ORAL at 04:46

## 2019-03-02 RX ADMIN — LEVETIRACETAM 500 MG: 250 TABLET, FILM COATED ORAL at 17:06

## 2019-03-02 RX ADMIN — LEVETIRACETAM 500 MG: 250 TABLET, FILM COATED ORAL at 04:44

## 2019-03-02 RX ADMIN — OXYCODONE HYDROCHLORIDE 10 MG: 10 TABLET ORAL at 10:41

## 2019-03-02 RX ADMIN — OXYCODONE HYDROCHLORIDE 10 MG: 10 TABLET ORAL at 14:54

## 2019-03-02 RX ADMIN — MORPHINE SULFATE 2 MG: 10 INJECTION INTRAVENOUS at 17:06

## 2019-03-02 RX ADMIN — POLYETHYLENE GLYCOL 3350 1 PACKET: 17 POWDER, FOR SOLUTION ORAL at 04:46

## 2019-03-02 RX ADMIN — OXYCODONE HYDROCHLORIDE 10 MG: 10 TABLET ORAL at 22:25

## 2019-03-02 RX ADMIN — DOCUSATE SODIUM 100 MG: 100 CAPSULE, LIQUID FILLED ORAL at 04:45

## 2019-03-02 RX ADMIN — MORPHINE SULFATE 2 MG: 10 INJECTION INTRAVENOUS at 12:56

## 2019-03-02 RX ADMIN — MORPHINE SULFATE 2 MG: 10 INJECTION INTRAVENOUS at 02:45

## 2019-03-02 ASSESSMENT — ENCOUNTER SYMPTOMS
NAUSEA: 0
SHORTNESS OF BREATH: 0
POLYDIPSIA: 0
BLURRED VISION: 1
CONSTIPATION: 1
MYALGIAS: 1
SPEECH CHANGE: 0
NECK PAIN: 0
FEVER: 0
BACK PAIN: 0
ABDOMINAL PAIN: 0
HEADACHES: 1
DIZZINESS: 1
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
## 2019-03-02 NOTE — PROGRESS NOTES
Assumed pt care at 0700. Pt resting comfortably. Complaining of 10/10 pain. PRN pain meds administered. Call light and personal belongings within reach. Bed in lowest and locked position. Pt calling appropriately. Hourly rounding in place.

## 2019-03-02 NOTE — PROGRESS NOTES
Patient received in his room,alert and oriented  to person,place and time. on room air sat 95%. Complained of  pain 7/10 at this time.PRN  medication given per MAR. States understanding of POC. Call light at reach, bed locked in low position, son at bed side.

## 2019-03-02 NOTE — ASSESSMENT & PLAN NOTE
Admission date  2/27/2019    Transfer from SICU  3/1    Rehab/SNF consult 3/1    Medically cleared for discharge 3/1      Not discharged: 3/3  Reasons:  Financial  Discharged Date   Pt has lots of home support.

## 2019-03-02 NOTE — PROGRESS NOTES
Pt A&Ox4, flat affect, subdued, quiet to answer questions, but able to complete admit profile. Cognition seems intact. Pt's son and a friend visited. Pt became much more lively during this interaction, participating in banter and joking. Pain controlled through noc shift with PO oxycodone and IV morphine.

## 2019-03-02 NOTE — PROGRESS NOTES
2 RN skin assessment done; skin WDL except sutured laceration on the right eye brows and scattered scabs on both hands

## 2019-03-02 NOTE — CARE PLAN
Problem: Safety  Goal: Will remain free from injury  Outcome: PROGRESSING AS EXPECTED  Call light and personal belongings within reach. Bed in lowest and locked position. Hourly rounding in place.     Problem: Pain Management  Goal: Pain level will decrease to patient's comfort goal  Outcome: PROGRESSING AS EXPECTED  Pain adequately controlled with PRN pain meds. Will continue to monitor.

## 2019-03-02 NOTE — DISCHARGE PLANNING
note:  Asked by YANIV to contact room mate if he can help pt at home.   Talked to pt who said that his lives with his friend Scotty and tel # [phone]. DAYA left a message for Scotty to call DAYA back.    Talked to pt's friend Joaquim who came to visit pt today. He confirmed that pt has multiple friends who can help him at home all the time. He assurred me that pt will be safe at home. Notified YANIV Damian.     Friend Scotty Dangnichot at [phone] has confirmed that he is available to help pt at home. Explained that pt may need intermittent supervision , meal prep ,etc and Scotty said that he would be happy to help pt with.

## 2019-03-03 ENCOUNTER — PATIENT OUTREACH (OUTPATIENT)
Dept: HEALTH INFORMATION MANAGEMENT | Facility: OTHER | Age: 44
End: 2019-03-03

## 2019-03-03 VITALS
OXYGEN SATURATION: 97 % | WEIGHT: 217.81 LBS | TEMPERATURE: 98 F | SYSTOLIC BLOOD PRESSURE: 116 MMHG | RESPIRATION RATE: 18 BRPM | DIASTOLIC BLOOD PRESSURE: 77 MMHG | BODY MASS INDEX: 29.5 KG/M2 | HEIGHT: 72 IN | HEART RATE: 89 BPM

## 2019-03-03 LAB
ANION GAP SERPL CALC-SCNC: 8 MMOL/L (ref 0–11.9)
BASOPHILS # BLD AUTO: 1.3 % (ref 0–1.8)
BASOPHILS # BLD: 0.1 K/UL (ref 0–0.12)
BUN SERPL-MCNC: 9 MG/DL (ref 8–22)
CALCIUM SERPL-MCNC: 8.6 MG/DL (ref 8.5–10.5)
CHLORIDE SERPL-SCNC: 104 MMOL/L (ref 96–112)
CO2 SERPL-SCNC: 26 MMOL/L (ref 20–33)
CREAT SERPL-MCNC: 0.84 MG/DL (ref 0.5–1.4)
EOSINOPHIL # BLD AUTO: 0.51 K/UL (ref 0–0.51)
EOSINOPHIL NFR BLD: 6.8 % (ref 0–6.9)
ERYTHROCYTE [DISTWIDTH] IN BLOOD BY AUTOMATED COUNT: 44.4 FL (ref 35.9–50)
GLUCOSE SERPL-MCNC: 97 MG/DL (ref 65–99)
HCT VFR BLD AUTO: 48.7 % (ref 42–52)
HGB BLD-MCNC: 15.9 G/DL (ref 14–18)
IMM GRANULOCYTES # BLD AUTO: 0.02 K/UL (ref 0–0.11)
IMM GRANULOCYTES NFR BLD AUTO: 0.3 % (ref 0–0.9)
LYMPHOCYTES # BLD AUTO: 1.88 K/UL (ref 1–4.8)
LYMPHOCYTES NFR BLD: 24.9 % (ref 22–41)
MCH RBC QN AUTO: 30.5 PG (ref 27–33)
MCHC RBC AUTO-ENTMCNC: 32.6 G/DL (ref 33.7–35.3)
MCV RBC AUTO: 93.5 FL (ref 81.4–97.8)
MONOCYTES # BLD AUTO: 0.76 K/UL (ref 0–0.85)
MONOCYTES NFR BLD AUTO: 10.1 % (ref 0–13.4)
NEUTROPHILS # BLD AUTO: 4.28 K/UL (ref 1.82–7.42)
NEUTROPHILS NFR BLD: 56.6 % (ref 44–72)
NRBC # BLD AUTO: 0 K/UL
NRBC BLD-RTO: 0 /100 WBC
PLATELET # BLD AUTO: 267 K/UL (ref 164–446)
PMV BLD AUTO: 10.1 FL (ref 9–12.9)
POTASSIUM SERPL-SCNC: 4.1 MMOL/L (ref 3.6–5.5)
RBC # BLD AUTO: 5.21 M/UL (ref 4.7–6.1)
SODIUM SERPL-SCNC: 138 MMOL/L (ref 135–145)
WBC # BLD AUTO: 7.6 K/UL (ref 4.8–10.8)

## 2019-03-03 PROCEDURE — 36415 COLL VENOUS BLD VENIPUNCTURE: CPT

## 2019-03-03 PROCEDURE — G0515 COGNITIVE SKILLS DEVELOPMENT: HCPCS

## 2019-03-03 PROCEDURE — 97530 THERAPEUTIC ACTIVITIES: CPT

## 2019-03-03 PROCEDURE — 80048 BASIC METABOLIC PNL TOTAL CA: CPT

## 2019-03-03 PROCEDURE — A9270 NON-COVERED ITEM OR SERVICE: HCPCS | Performed by: NURSE PRACTITIONER

## 2019-03-03 PROCEDURE — 85025 COMPLETE CBC W/AUTO DIFF WBC: CPT

## 2019-03-03 PROCEDURE — 700102 HCHG RX REV CODE 250 W/ 637 OVERRIDE(OP): Performed by: NURSE PRACTITIONER

## 2019-03-03 PROCEDURE — 700112 HCHG RX REV CODE 229: Performed by: NURSE PRACTITIONER

## 2019-03-03 PROCEDURE — 97535 SELF CARE MNGMENT TRAINING: CPT

## 2019-03-03 RX ORDER — LEVETIRACETAM 500 MG/1
500 TABLET ORAL 2 TIMES DAILY
Qty: 4 TAB | Refills: 0 | Status: SHIPPED | OUTPATIENT
Start: 2019-03-03 | End: 2019-03-03

## 2019-03-03 RX ORDER — LEVETIRACETAM 500 MG/1
500 TABLET ORAL 2 TIMES DAILY
Qty: 4 TAB | Refills: 0 | Status: SHIPPED | OUTPATIENT
Start: 2019-03-03

## 2019-03-03 RX ORDER — TRAMADOL HYDROCHLORIDE 50 MG/1
50 TABLET ORAL EVERY 6 HOURS PRN
Status: DISCONTINUED | OUTPATIENT
Start: 2019-03-03 | End: 2019-03-03 | Stop reason: HOSPADM

## 2019-03-03 RX ADMIN — DOCUSATE SODIUM 100 MG: 100 CAPSULE, LIQUID FILLED ORAL at 06:14

## 2019-03-03 RX ADMIN — OXYCODONE HYDROCHLORIDE 10 MG: 10 TABLET ORAL at 10:38

## 2019-03-03 RX ADMIN — LEVETIRACETAM 500 MG: 250 TABLET, FILM COATED ORAL at 16:48

## 2019-03-03 RX ADMIN — OXYCODONE HYDROCHLORIDE 10 MG: 10 TABLET ORAL at 02:03

## 2019-03-03 RX ADMIN — LEVETIRACETAM 500 MG: 250 TABLET, FILM COATED ORAL at 06:13

## 2019-03-03 ASSESSMENT — ENCOUNTER SYMPTOMS
NAUSEA: 0
POLYDIPSIA: 0
SHORTNESS OF BREATH: 0
NECK PAIN: 0
BACK PAIN: 0
SPEECH CHANGE: 0
MYALGIAS: 1
DIZZINESS: 1
HEADACHES: 1
BLURRED VISION: 1
CONSTIPATION: 1
FEVER: 0
ABDOMINAL PAIN: 0

## 2019-03-03 ASSESSMENT — COGNITIVE AND FUNCTIONAL STATUS - GENERAL
PERSONAL GROOMING: A LITTLE
EATING MEALS: A LITTLE
SUGGESTED CMS G CODE MODIFIER DAILY ACTIVITY: CJ
HELP NEEDED FOR BATHING: A LITTLE
TOILETING: A LITTLE
DAILY ACTIVITIY SCORE: 20

## 2019-03-03 NOTE — THERAPY
"Speech Language Therapy cognitive-linguistic treatment completed.   Functional Status:  The patient was seen for cognitive-linguistic re-assessment this date following patient request to go home. Patient presented with mild-moderate deficits in auditory comprehension, thought organization, verbal sequencing, attention to task and problem solving. Patient demonstrated severe deficits in executive functioning, reasoning, and refused reading comprehension and written expression tasks. Patient continues to demonstrate decreased insight into deficits as well as avoidance behaviors during more challenging cognitive assessments and ultimately declined further assessment once tasks became difficult. Patient and his family were provided extensive education regarding current cognitive deficits, safety of self and others, as well as returning to work. At this time, patient presents with cognitive-linguistic deficits in the domains listed above which can place him at increased risk for safety. Patient demonstrated clear signs of being overwhelmed with education and ultimately requested to \"just tell her (pt's sister).\" Patient's sister expressed concerns regarding patient's current medical status but reported she can provide 24/7 supervision. Patient and his sister were provided handouts regarding cognitive problems after TBI as well as emotional changes as patient's impulsivity and decreased insight into safety remain a concern. Recommend patient have 24/7 supervision for initial discharge phase with continued cognitive-linguistic therapy to address stated deficits.     Recommendations: 1) Patient have 24/7 supervision for initial discharge phase with continued cognitive-linguistic therapy to address stated deficits.     Plan of Care: Will benefit from Speech Therapy 5 times per week    Post-Acute Therapy: Recommend inpatient transitional care services for continued speech therapy services or outpatient or home health " "transitional care services for continued speech therapy services        See \"Rehab Therapy-Acute\" Patient Summary Report for complete documentation.     "

## 2019-03-03 NOTE — PROGRESS NOTES
"Pt was able to get through the night on oxycodone alone with no IV morphine breakthrough. Had BM. Ambulated without assistance, steady gait.    Pt got up at 0606 without calling went into bathroom and got in shower without warning. RN attempted to educate pt (from outside the shower curtain) not to soak his sutures and IV. Pt's response was \"too late.\" RN acknowledged that they may already be wet, but still please not to soak them for a long time. Pt got out of shower after a couple of minutes and dried off. Inspected sutures and IV, appear okay.   "
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
## 2019-03-03 NOTE — PROGRESS NOTES
OT in to see pt. Discussed pt's care with JANE Damian. Walker ordered. Speech in to see pt. Updated JANE Damian concerning Speech's finding. Anticipating D/C home, family supervision 24/7. Discussed pt's care with  today.

## 2019-03-03 NOTE — PROGRESS NOTES
Blood pressure 122/64, pulse 85, temperature 36.4 °C (97.6 °F), temperature source Temporal, resp. rate 18, height 1.829 m (6'), weight 98.8 kg (217 lb 13 oz), SpO2 93 %.      Pt seen by all therapies and cleared for discharge with 24/7 supervision.   Pt will follow up with Neurosurgery as directed and speech for repeat cognitive evaluation.  Follow ups to occur within two weeks of discharge.
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
## 2019-03-03 NOTE — PROGRESS NOTES
Received care of pt from NOC RN this am. Pt is A+O. Denies need for pain medications. Irritable. Wanting to be D/C'D. Emotional support given. Discussed pt's care with Rickie LARA; will discuss care with therapies per APN's request.

## 2019-03-04 NOTE — PROGRESS NOTES
Pt irritable. Yelling at this RN about pain medication. Saying he can't take tylenol because of a diagnosis of hepatitis a few years ago. Called Rickie concerning pain medication for D/C, she says she will be up to see pt. Discharge instructions given to patient's sister at bedside, she verbalizes understanding and states plans for follow-up with PCP. IV cathlon removed. All belongings accounted for, all sister's questions answered at this time. Pt does not want to stay, walking out with his minor son. Minor son leading pt down the rockwell. Attempted to educate them to stay, that APN was coming. Sister follows pt and son down the rockwell. Rickie here, informed her they had left.

## 2019-03-04 NOTE — DISCHARGE INSTRUCTIONS
Discharge Instructions    Discharged to home by car with relative. Discharged via wheelchair, hospital escort: Yes.  Special equipment needed: Walker - given walker for home use     Be sure to schedule a follow-up appointment with your primary care doctor or any specialists as instructed.     Discharge Plan:   Diet Plan: Discussed - regular diet, soft, purreed foods with chopped, soft meats.   Activity Level: Discussed - activity as tolerated - use your walker.   Smoking Cessation Offered: Patient Refused  Confirmed Follow up Appointment: Patient to Call and Schedule Appointment - follow up as recommended with primary care MD.   Confirmed Symptoms Management: Discussed  Medication Reconciliation Updated: Yes  Influenza Vaccine Indication: Patient Refuses    I understand that a diet low in cholesterol, fat, and sodium is recommended for good health. Unless I have been given specific instructions below for another diet, I accept this instruction as my diet prescription.       Special Instructions: patient going home, needs supervision 24/7. Needs to follow up ASAP for primary care MD, needs repeat speech cognition evaluation in 1-2 weeks, primary care referral needed.      · Is patient discharged on Warfarin / Coumadin?   No     Depression / Suicide Risk    As you are discharged from this [facility], it is important to learn how to keep safe from harming yourself.    Recognize the warning signs:  · Abrupt changes in personality, positive or negative- including increase in energy   · Giving away possessions  · Change in eating patterns- significant weight changes-  positive or negative  · Change in sleeping patterns- unable to sleep or sleeping all the time   · Unwillingness or inability to communicate  · Depression  · Unusual sadness, discouragement and loneliness  · Talk of wanting to die  · Neglect of personal appearance   · Rebelliousness- reckless behavior  · Withdrawal from people/activities they  love  · Confusion- inability to concentrate     If you or a loved one observes any of these behaviors or has concerns about self-harm, here's what you can do:  · Talk about it- your feelings and reasons for harming yourself  · Remove any means that you might use to hurt yourself (examples: pills, rope, extension cords, firearm)  · Get professional help from the community (Mental Health, Substance Abuse, psychological counseling)  · Do not be alone:Call your Safe Contact- someone whom you trust who will be there for you.  · Call your local CRISIS HOTLINE 672-2756 or 053-824-8258  · Call your local Children's Mobile Crisis Response Team [place] (719) 357-8401 or www.[facility]  · Call the toll free National Suicide Prevention Hotlines   · National Suicide Prevention Lifeline 726-728-OEYN (0264)  · National Hope Line Network 800-SUICIDE (514-1984)

## 2019-03-04 NOTE — DISCHARGE PLANNING
TC from bedside RN, Erma concerning Keppra and [employer] price.  Researched and price is $9.00 for uninsured, self pay.  Information given to Erma.

## 2019-04-25 NOTE — ADDENDUM NOTE
Encounter addended by: Blanka Villavicencio R.N. on: 4/25/2019  8:29 AM<BR>    Actions taken: Flowsheet accepted

## 2020-07-30 NOTE — PROGRESS NOTES
2 RN Skin check complete    -Laceration to L eyebrow, sutured and well-approximated, open to air  -L eye ecchymosis and swelling    No new areas of concern. All appropriate interventions in place.   Please help to schedule.  Can also see Nikia Cook